# Patient Record
Sex: FEMALE | Race: BLACK OR AFRICAN AMERICAN | URBAN - METROPOLITAN AREA
[De-identification: names, ages, dates, MRNs, and addresses within clinical notes are randomized per-mention and may not be internally consistent; named-entity substitution may affect disease eponyms.]

---

## 2017-05-19 ENCOUNTER — HOSPITAL ENCOUNTER (OUTPATIENT)
Dept: MEDSURG UNIT | Facility: HOSPITAL | Age: 77
End: 2017-05-20
Attending: SURGERY | Admitting: SURGERY

## 2017-05-19 LAB
ABS NEUT (OLG): 9.8 X10(3)/MCL (ref 1.5–6.9)
BASOPHILS # BLD AUTO: 0 X10(3)/MCL (ref 0–0.1)
BASOPHILS NFR BLD AUTO: 0 % (ref 0–1)
EOSINOPHIL # BLD AUTO: 0 X10(3)/MCL (ref 0–0.6)
EOSINOPHIL NFR BLD AUTO: 0 % (ref 0–5)
ERYTHROCYTE [DISTWIDTH] IN BLOOD BY AUTOMATED COUNT: 15.9 % (ref 11.5–17)
HCT VFR BLD AUTO: 29.7 % (ref 36–48)
HGB BLD-MCNC: 9.6 GM/DL (ref 12–16)
LYMPHOCYTES # BLD AUTO: 1.8 X10(3)/MCL (ref 0.5–4.1)
LYMPHOCYTES NFR BLD AUTO: 13.3 % (ref 15–40)
MCH RBC QN AUTO: 30 PG (ref 27–34)
MCHC RBC AUTO-ENTMCNC: 32 GM/DL (ref 31–36)
MCV RBC AUTO: 93 FL (ref 80–99)
MONOCYTES # BLD AUTO: 1.5 X10(3)/MCL (ref 0–1.1)
MONOCYTES NFR BLD AUTO: 11 % (ref 4–12)
NEUTROPHILS # BLD AUTO: 9.8 X10(3)/MCL (ref 1.5–6.9)
NEUTROPHILS NFR BLD AUTO: 74 % (ref 43–75)
PLATELET # BLD AUTO: 435 X10(3)/MCL (ref 140–400)
PMV BLD AUTO: 9.8 FL (ref 6.8–10)
RBC # BLD AUTO: 3.19 X10(6)/MCL (ref 4.2–5.4)
WBC # SPEC AUTO: 13.2 X10(3)/MCL (ref 4.5–11.5)

## 2017-05-24 ENCOUNTER — HISTORICAL (OUTPATIENT)
Dept: SURGERY | Facility: HOSPITAL | Age: 77
End: 2017-05-24

## 2017-05-26 ENCOUNTER — HISTORICAL (OUTPATIENT)
Dept: ANESTHESIOLOGY | Facility: HOSPITAL | Age: 77
End: 2017-05-26

## 2017-05-26 LAB
ABS NEUT (OLG): 12 X10(3)/MCL (ref 1.5–6.9)
ALBUMIN SERPL-MCNC: 2.1 GM/DL (ref 3.4–5)
ALBUMIN/GLOB SERPL: 0.5 RATIO
ALP SERPL-CCNC: 472 UNIT/L (ref 30–113)
ALT SERPL-CCNC: 217 UNIT/L (ref 10–45)
APTT PPP: 26.6 SECOND(S) (ref 25–35)
AST SERPL-CCNC: 429 UNIT/L (ref 15–37)
BASOPHILS NFR BLD MANUAL: 0 % (ref 0–1)
BILIRUB SERPL-MCNC: 1 MG/DL (ref 0.1–0.9)
BILIRUBIN DIRECT+TOT PNL SERPL-MCNC: 0.2 MG/DL
BILIRUBIN DIRECT+TOT PNL SERPL-MCNC: 0.8 MG/DL (ref 0–0.3)
BUN SERPL-MCNC: 40 MG/DL (ref 10–20)
CALCIUM SERPL-MCNC: 8.7 MG/DL (ref 8–10.5)
CHLORIDE SERPL-SCNC: 107 MMOL/L (ref 100–108)
CO2 SERPL-SCNC: 26 MMOL/L (ref 21–35)
CREAT SERPL-MCNC: 1.31 MG/DL (ref 0.7–1.3)
EOSINOPHIL NFR BLD MANUAL: 1 % (ref 0–5)
ERYTHROCYTE [DISTWIDTH] IN BLOOD BY AUTOMATED COUNT: 16.5 % (ref 11.5–17)
GLOBULIN SER-MCNC: 4.4 GM/DL
GLUCOSE SERPL-MCNC: 102 MG/DL (ref 75–116)
GRANULOCYTES NFR BLD MANUAL: 75 % (ref 47–80)
HCT VFR BLD AUTO: 32.7 % (ref 36–48)
HGB BLD-MCNC: 10.7 GM/DL (ref 12–16)
INR PPP: 1.1 (ref 0–1.2)
LYMPHOCYTES NFR BLD MANUAL: 9 % (ref 15–40)
MCH RBC QN AUTO: 30 PG (ref 27–34)
MCHC RBC AUTO-ENTMCNC: 33 GM/DL (ref 31–36)
MCV RBC AUTO: 91 FL (ref 80–99)
METAMYELOCYTES NFR BLD MANUAL: 1 %
MONOCYTES NFR BLD MANUAL: 13 % (ref 4–12)
NEUTS BAND NFR BLD MANUAL: 1 % (ref 1–5)
PLATELET # BLD AUTO: 365 X10(3)/MCL (ref 140–400)
PLATELET # BLD EST: ADEQUATE 10*3/UL
PMV BLD AUTO: 10.6 FL (ref 6.8–10)
POTASSIUM SERPL-SCNC: 3.9 MMOL/L (ref 3.6–5.2)
PROT SERPL-MCNC: 6.5 GM/DL (ref 6.4–8.2)
PROTHROMBIN TIME: 11.9 SECOND(S) (ref 9–12)
RBC # BLD AUTO: 3.58 X10(6)/MCL (ref 4.2–5.4)
RBC MORPH BLD: NORMAL
SODIUM SERPL-SCNC: 142 MMOL/L (ref 135–145)
WBC # SPEC AUTO: 15.8 X10(3)/MCL (ref 4.5–11.5)

## 2022-04-30 NOTE — OP NOTE
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    PROCEDURE:  Mediport insertion, right internal jugular, using anterior Seldinger technique with C-arm.    POSTOPERATIVE DIAGNOSIS:  Successful Mediport placement, right internal jugular, using anterior Seldinger technique.    INDICATIONS:  The patient is a 76-year-old with inflammatory breast cancer, stage IV, of the left breast requiring Mediport placement for chemo and radiation.  The patient was brought to the operating room in supine position, neck in full extension.  Had a finder needle access of the right internal jugular vein with insertion of __________ access needle and then a guidewire, after being prepped and draped in sterile fashion with full-body gowns, drapes, and gloves.  The patient had a guidewire placed from the superior to inferior vena cava, from the right IJ, and then had C-arm verify.  Once this was done, the patient had dilator and peel-away sheath placed.  8-Romanian raised PowerPort was placed on the right anterior chest wall.  A pocket was created over the prepectoral fascia and the port was attached to the prepectoral fascia.  The catheter was tunneled to the insertion site of the right IJ and the catheter was placed through the peel-away sheath after the dilator and guidewire were removed.  Once this was done, the patient had the subcu closed with 3-0 Vicryl.  The skin was closed with 4-0 plain gut suture.  Dermabond was used for the skin.  Sterile dressings were applied.  No problems encountered.  The patient tolerated the procedure well.         I appreciate the consultation referral from Dr. Rogelio Roldan and will notify him of my findings, as well as Dr. oJ Schaffer.        BBS/MODL   DD: 05/26/2017 1134   DT: 05/26/2017 1156  Job # [id number]    cc: NICOLE Agudelo M.D.

## 2022-04-30 NOTE — OP NOTE
ADMITTING DIAGNOSIS:  Left breast mass.    PROCEDURE:  Giorgi-Cut biopsy, left breast mass.    INDICATIONS:  The patient is a 76-year-old  female with a left breast mass, hard, firm, nonmobile, consistent with inflammatory breast carcinoma, present for over 4-5 months.  The patient required Giorgi-Cut biopsy.    PROCEDURE IN DETAIL:  She was brought to the OR, sedated, and underwent Giorgi-Cut biopsy with an 18-gauge needle.  A small incision with a 15 blade scalpel was made to allow the Giorgi-Cut needle in, and then biopsies were taken.  Specimen was sent to pathology.  The Giorgi-Cut site was closed with 4-0 plain gut suture and Dermabond.  Sterile dressings were applied.  No problems were encountered.  Patient tolerated the procedure well.       I did consult Oncology for assistance in medical management.  I appreciate the consultation referral from Dr. Robbins and will notify him of my findings.        DIANA/ARIE   DD: 05/19/2017 1738   DT: 05/19/2017 1830  Job # [id number]    cc: Dr. Robbins
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
